# Patient Record
Sex: FEMALE | Race: WHITE | URBAN - METROPOLITAN AREA
[De-identification: names, ages, dates, MRNs, and addresses within clinical notes are randomized per-mention and may not be internally consistent; named-entity substitution may affect disease eponyms.]

---

## 2021-07-23 ENCOUNTER — OFFICE VISIT (OUTPATIENT)
Dept: URBAN - METROPOLITAN AREA CLINIC 59 | Facility: CLINIC | Age: 26
End: 2021-07-23
Payer: COMMERCIAL

## 2021-07-23 DIAGNOSIS — H04.123 TEAR FILM INSUFFICIENCY OF BILATERAL LACRIMAL GLANDS: Primary | ICD-10-CM

## 2021-07-23 DIAGNOSIS — H52.13 MYOPIA, BILATERAL: ICD-10-CM

## 2021-07-23 PROCEDURE — 92004 COMPRE OPH EXAM NEW PT 1/>: CPT | Performed by: OPTOMETRIST

## 2021-07-23 ASSESSMENT — VISUAL ACUITY
OD: 20/20
OS: 20/20

## 2021-07-23 ASSESSMENT — INTRAOCULAR PRESSURE
OD: 16
OS: 16

## 2021-07-23 NOTE — IMPRESSION/PLAN
Impression: Tear film insufficiency of bilateral lacrimal glands: H04.123. Plan: Patient educated on findings. Lissamine green staining done today. Recommend patient to use artificial tears at least 4-6 times a day. Dry eye handout was given to patient indicating recommendations. Made patient aware if these recommendations don't improve condition will consider adding Restasis/Xiidra and/or punctal plugs.